# Patient Record
Sex: FEMALE | Race: WHITE | NOT HISPANIC OR LATINO
[De-identification: names, ages, dates, MRNs, and addresses within clinical notes are randomized per-mention and may not be internally consistent; named-entity substitution may affect disease eponyms.]

---

## 2018-03-05 ENCOUNTER — TRANSCRIPTION ENCOUNTER (OUTPATIENT)
Age: 63
End: 2018-03-05

## 2019-12-08 ENCOUNTER — TRANSCRIPTION ENCOUNTER (OUTPATIENT)
Age: 64
End: 2019-12-08

## 2022-06-01 ENCOUNTER — APPOINTMENT (OUTPATIENT)
Dept: ORTHOPEDIC SURGERY | Facility: CLINIC | Age: 67
End: 2022-06-01
Payer: COMMERCIAL

## 2022-06-01 VITALS — HEIGHT: 62 IN | BODY MASS INDEX: 27.97 KG/M2 | WEIGHT: 152 LBS

## 2022-06-01 DIAGNOSIS — M25.571 PAIN IN RIGHT ANKLE AND JOINTS OF RIGHT FOOT: ICD-10-CM

## 2022-06-01 DIAGNOSIS — S93.601A UNSPECIFIED SPRAIN OF RIGHT FOOT, INITIAL ENCOUNTER: ICD-10-CM

## 2022-06-01 PROCEDURE — L4361: CPT | Mod: RT

## 2022-06-01 PROCEDURE — 73610 X-RAY EXAM OF ANKLE: CPT | Mod: RT

## 2022-06-01 PROCEDURE — 73630 X-RAY EXAM OF FOOT: CPT | Mod: RT

## 2022-06-01 PROCEDURE — 99204 OFFICE O/P NEW MOD 45 MIN: CPT | Mod: 25

## 2022-06-01 RX ORDER — MELOXICAM 15 MG/1
15 TABLET ORAL DAILY
Qty: 30 | Refills: 0 | Status: ACTIVE | COMMUNITY
Start: 2022-06-01 | End: 1900-01-01

## 2022-06-01 NOTE — ASSESSMENT
[FreeTextEntry1] : After their examination today in the office and review of their radiographs, they have sustained a moderate medial midfoot sprain/contusion as result of their injury. I would recommend protection and immobilization in a Cam Walker boot since they are having difficulty with weight-bearing, secondary to pain. The Cam Walker boot was manipulated and fitted to them properly today in the office. They were able to walk comfortably in the CAM walker boot. However, I did also recommend using crutches for the next 3-5 days until the pain further resolves and they are able to walk fully in the Cam Walker boot comfortably without any pain. I would like them to come out of the boot for range of motion exercises and local ice therapy throughout the day, and they can also use an oral anti-inflammatory as tolerated for the next 3-5 days. I would like them to refrain from any high impact or strenuous physical activities. I will see them back in 2 weeks for repeat clinical and weight bearing radiographic examination. We also discussed at length that it can take 6-12 weeks for this injury to heal for their pain to hopefully fully resolve. We also discussed in detail that ankle sprains can lead to chronic pain or symptoms of instability that may require additional treatment.\par

## 2022-06-01 NOTE — HISTORY OF PRESENT ILLNESS
[Sudden] : sudden [3] : 3 [1] : 2 [Dull/Aching] : dull/aching [Throbbing] : throbbing [Tightness] : tightness [Frequent] : frequent [Sleep] : sleep [Rest] : rest [Ice] : ice [Walking] : walking [de-identified] :  MYA DRAKE is a 66 year female who is here today for right ankle pain. Pt fell down and hurt the right ankle.\par \par Ms. DRAKE is a 66 year female who presents today for evaluation of their Right ankle and foot injury. She states that a week ago she fell down the stairs and twisted her right ankle and foot she noticed pain on the top and inside aspect of her foot. She does find herself limping and needing to take an anti-inflammatory on a daily basis. She feels that the pain has only minimally improved. She denies any ankle leg or calf pain she does notice a mild swelling without any bruising. she denies any recurrence symptoms of ankle or foot sprain  [] : no [FreeTextEntry1] : right ankle [FreeTextEntry3] : 5/18/22 [FreeTextEntry5] :  MYA DRAKE is a 66 year female who is here today for right ankle pain. Pt fell down and hurt the right ankle.\par  [FreeTextEntry7] : up the back of the leg sometimes [FreeTextEntry9] : Motrin [de-identified] : motion

## 2022-06-01 NOTE — PHYSICAL EXAM
[de-identified] : General: Well-nourished, well developed female in no apparent distress\par Psych: Clear speech, pleasant mood and affect\par Neurological: Alert and oriented to person, place, and time\par Gait: Normal\par Respiratory: Normal respiratory effort\par Skin: No rashes, no lesions, no open skin, no ecchymosis, no erythema\par \par \par Inspection: No swelling, ecchymosis or redness noted.\par \par Alignment: Hindfoot alignment in anatomic valgus, neutral midfoot alignment.\par  \par Palpation: No anterior medial, central or lateral ankle joint line tenderness. No posterior medial or lateral ankle pain. No pain over proximal, midshaft or distal tibia or fibula. Leg Compartments are soft and nontender. Calf is soft and non-tender with a down-going Shi test. No pain over the lateral and medial malleolus. No pain over ATFL and CFL. Non-tender over the deltoid ligament or proximal and distal syndesmosis. Negative squeeze test of the syndesmosis. Non-tender over the fibula head or neck. Non-tender over the sinus tarsi.\par No pain over the course of the achilles, peroneal, posterior tibial, anterior tibial or the extensor tendons. \par On examination of the foot: Foot compartments are soft and nontender. No pain over the heel or plantar fascia. She is tender over the talar-navicular joint and navicular tuberosity. No tenderness over the calcaneocuboid joints, TMT or the Lisfranc joints on palpation and stress examination. No tenderness over the navicular, cuboid, cuneiforms, or metatarsals shafts. No pain beneath the metatarsal heads. Full range of motion through the MTP joints. The toes are reduced and well aligned. No pain on examination of the toes, and no webspace tenderness noted\par \par ROM: 15-20 degrees of dorsiflexion, 40 degrees of plantar flexion, 20 degrees of inversion and 20 degrees of eversion without pain. Able to flex and extend all toes without pain \par \par Muscle Strength: 5/5 strength with resisted dorsiflexion, plantar flexion, inversion and eversion without any pain.\par \par Stability: No instability or laxity noted with varus/valgus stress. Negative anterior and posterior drawer test. No pain with dorsiflexion external rotation stress test.\par \par Neurologic Exam : Sensation is grossly intact bilateral upper and lower extremities to light touch throughout. Sensation intact to the sural, posterior tibial, superficial peroneal nerve. 2+ patellar tendon and Achilles tendon reflexes are noted. There is a downgoing Babinski test. No clonus is noted bilaterally.\par \par Vascular: Arterial Pulses right and Left: posterior tibialis normal and dorsalis pedis normal. Right and Left: no edema, no varicosities and brisk capillary refill test normal.\par \par Radiographs: X-rays done today in the office 3 views of the ankle and foot without any limitations which reveal: No acute fractures or dislocations seen. The ankle mortise and syndesmosis are reduced and well aligned. There is no widening of the syndesmosis. No evidence of an osteochondral defect. No fractures of the lateral process of the talus or of the anterior process of the calcaneus noted. The midfoot and forefoot joints are reduced and well aligned.\par

## 2022-06-12 ENCOUNTER — FORM ENCOUNTER (OUTPATIENT)
Age: 67
End: 2022-06-12

## 2022-06-13 ENCOUNTER — APPOINTMENT (OUTPATIENT)
Dept: MRI IMAGING | Facility: CLINIC | Age: 67
End: 2022-06-13
Payer: COMMERCIAL

## 2022-06-13 PROCEDURE — 73718 MRI LOWER EXTREMITY W/O DYE: CPT | Mod: RT

## 2022-06-15 ENCOUNTER — APPOINTMENT (OUTPATIENT)
Dept: ORTHOPEDIC SURGERY | Facility: CLINIC | Age: 67
End: 2022-06-15
Payer: COMMERCIAL

## 2022-06-15 VITALS — WEIGHT: 152 LBS | HEIGHT: 62 IN | BODY MASS INDEX: 27.97 KG/M2

## 2022-06-15 PROCEDURE — 99214 OFFICE O/P EST MOD 30 MIN: CPT

## 2022-06-15 NOTE — HISTORY OF PRESENT ILLNESS
[4] : 4 [1] : 2 [de-identified] : 6/15/2022- pt is here for a follow up on her right ankle. She would like to review her mri results that she has done on Monday\par \par Is here for follow up of her medial midfoot injury and distal posterior tibial tendon injury. She has been protected in a Cam Walker boot and she feels that her pain has improved significantly. She still has some mild pain on the inside aspect of the foot. She denies any calf pain or any shortness of breath. She does feel that she is improving and healing well. [FreeTextEntry1] : right ankle [FreeTextEntry5] : pt is here for a follow up on her right ankle. She would like to review her mri results that she has done on Monday

## 2022-06-15 NOTE — PHYSICAL EXAM
[de-identified] : General: Well-nourished, well developed female in no apparent distress\par Psych: Clear speech, pleasant mood and affect\par Neurological: Alert and oriented to person, place, and time\par Gait: antalgic\par Respiratory: Normal respiratory effort\par Skin: No rashes, no lesions, no open skin, no ecchymosis, no erythema\par \par \par Inspection: No swelling, ecchymosis or redness noted.\par \par Alignment: Hindfoot alignment in anatomic valgus, neutral midfoot alignment.\par  \par Palpation: No anterior medial, central or lateral ankle joint line tenderness. No posterior medial or lateral ankle pain. No pain over proximal, midshaft or distal tibia or fibula. Leg Compartments are soft and nontender. Calf is soft and non-tender with a down-going Shi test. No pain over the lateral and medial malleolus. No pain over ATFL and CFL. Non-tender over the deltoid ligament or proximal and distal syndesmosis. Negative squeeze test of the syndesmosis. Non-tender over the fibula head or neck. Non-tender over the sinus tarsi.\par No pain over the course of the achilles, peroneal, proximal posterior tibial, anterior tibial or the extensor tendons. \par On examination of the foot: Foot compartments are soft and nontender. No pain over the heel or plantar fascia. She is tender over the talar-navicular joint and navicular tuberosity. No tenderness over the calcaneocuboid joints, TMT or the Lisfranc joints on palpation and stress examination. No tenderness over the navicular, cuboid, cuneiforms, or metatarsals shafts. No pain beneath the metatarsal heads. Full range of motion through the MTP joints. The toes are reduced and well aligned. No pain on examination of the toes, and no webspace tenderness noted\par \par ROM: 15-20 degrees of dorsiflexion, 40 degrees of plantar flexion, 20 degrees of inversion and 20 degrees of eversion without pain. Able to flex and extend all toes without pain \par \par Muscle Strength: 5/5 strength with resisted dorsiflexion, plantar flexion, inversion and eversion without any pain.\par \par Stability: No instability or laxity noted with varus/valgus stress. Negative anterior and posterior drawer test. No pain with dorsiflexion external rotation stress test.\par \par Neurologic Exam : Sensation is grossly intact bilateral upper and lower extremities to light touch throughout. Sensation intact to the sural, posterior tibial, superficial peroneal nerve. 2+ patellar tendon and Achilles tendon reflexes are noted. There is a downgoing Babinski test. No clonus is noted bilaterally.\par \par Vascular: Arterial Pulses right and Left: posterior tibialis normal and dorsalis pedis normal. Right and Left: no edema, no varicosities and brisk capillary refill test normal.\par \par \par

## 2022-06-15 NOTE — ASSESSMENT
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 I did recommend three more weeks of protection in the air Cam Walker boot which she feels has been helping her significantly she can remove the boot and perform range of motion exercises as well as heat and ice therapy interchangeably she can perform range of motion exercises throughout the day. And I would like to see her back in three weeks for repeat clinical and weight bearing radio graphic examination and upper pain has improved significantly we can then begin physical therapy.

## 2022-07-06 ENCOUNTER — APPOINTMENT (OUTPATIENT)
Dept: ORTHOPEDIC SURGERY | Facility: CLINIC | Age: 67
End: 2022-07-06

## 2022-08-24 ENCOUNTER — APPOINTMENT (OUTPATIENT)
Dept: ORTHOPEDIC SURGERY | Facility: CLINIC | Age: 67
End: 2022-08-24

## 2022-08-24 VITALS — BODY MASS INDEX: 27.97 KG/M2 | WEIGHT: 152 LBS | HEIGHT: 62 IN

## 2022-08-24 DIAGNOSIS — M25.571 PAIN IN RIGHT ANKLE AND JOINTS OF RIGHT FOOT: ICD-10-CM

## 2022-08-24 DIAGNOSIS — S93.601D UNSPECIFIED SPRAIN OF RIGHT FOOT, SUBSEQUENT ENCOUNTER: ICD-10-CM

## 2022-08-24 DIAGNOSIS — S92.254D NONDISPLACED FRACTURE OF NAVICULAR [SCAPHOID] OF RIGHT FOOT, SUBSEQUENT ENCOUNTER FOR FRACTURE WITH ROUTINE HEALING: ICD-10-CM

## 2022-08-24 DIAGNOSIS — M76.821 POSTERIOR TIBIAL TENDINITIS, RIGHT LEG: ICD-10-CM

## 2022-08-24 PROCEDURE — 73610 X-RAY EXAM OF ANKLE: CPT | Mod: RT

## 2022-08-24 PROCEDURE — 99213 OFFICE O/P EST LOW 20 MIN: CPT

## 2022-08-24 PROCEDURE — 73630 X-RAY EXAM OF FOOT: CPT | Mod: RT

## 2022-08-24 NOTE — ASSESSMENT
[FreeTextEntry1] : After her examination today in the office and review of her radiographs I do think she continues to remain mildly tender over the medial aspect of the navicular she did have a injury to the medial navicular over the distal posterior tibial tendon however she has no pain when she is walking in a good supportive shoe or sneaker.  I did recommend starting physical therapy to work on range of motion and strengthening and stabilization balancing with strengthening and local modalities to the distal posterior tibial tendon as well as its insertion onto the navicular tuberosity.  I would like her to try to refrain from high heels or wedged shoes till she completes her physical therapy regimen until we are able to condition the ankle and foot.  I did recommend local ice and heat therapy as needed over the medial aspect of the ankle and foot and to wear good supportive shoe wear on a daily basis and I would like to see her back in 4 to 6 weeks when she completes her physical therapy regimen.\par I discussed with her that if she continues to have pain over this area of the medial navicular then I would recommend a CT scan if physical therapy does not help resolve her pain or discomfort

## 2022-08-24 NOTE — HISTORY OF PRESENT ILLNESS
[3] : 3 [1] : 2 [Dull/Aching] : dull/aching [Rest] : rest [Walking] : walking [de-identified] : Is here for follow-up of her right ankle and foot injury.  She states that she had been doing very well and she continues to do well when she does wear a sneaker or a good supportive sandal.  However she tried to wear a wedged shoe with a slight heel and noticed increased pain on the inside aspect of the ankle and foot.  With a few days of rest the pain had resolved.  She does not have any swelling or bruising or any significant weakness of the ankle or foot.

## 2022-08-24 NOTE — IMAGING
[de-identified] : General: Well-nourished,  in no apparent distress\par Psych: Clear speech, pleasant mood and affect\par Neurological: Alert and oriented to person, place, and time\par Gait: Normal\par Respiratory: Normal respiratory effort\par Skin: No rashes, no lesions, no open skin, no ecchymosis, no erythema\par \par On examination of the ankle and foot there is no swelling or ecchymosis or erythema noted. Hindfoot alignment is in slight valgus. There is no pain over the proximal mid shaft or distal tibia or fibula. Negative syndesmotic squeeze test. There is no pain over the ankle joint, subtalar joint, transverse tarsal joints, or TMT joints. No pain over the medial or lateral malleolus. No pain over the proximal or mid shaft tibia or fibula. The leg compartments including the calf are soft and non-tender with a down-going Shi test. There is no pain over the ATFL or CFL laterally or deltoid ligament medially. \par There is no pain  over the course of the Achilles, peroneal or proximal posterior tibial tendons.  She is tender mildly over the distal posterior tibial tendon as well as over the medial aspect of the navicular and the navicular tuberosity.  No ankle or hindfoot instability is noted. No pain over the calcaneus, talar neck or talar head. No pain over the navicular, cuboid, cuneiforms, metatarsal shafts or on examination of the toes. No web-space tenderness. No pain over the metatarsal heads or MTP joints. There is full flexion and extension of all the toes. The hallux and lesser toes are reduced and well aligned. \par Sensation is grossly intact throughout to light touch, there is 2+ Achilles tendon reflexes. There is 2+ DP/PT pulses, with brisk capillary refill in all of the toes.\par There is good range of motion of the ankle and hindfoot with 5/5 strength throughout all muscle groups.\par \par X-rays done today in the office 3 views of the ankle and foot which reveals no acute fractures or dislocations.  No fracture of the medial navicular is noted on the radiographs.  The ankle mortise and syndesmosis are reduced and well aligned. The midfoot and forefoot joints are reduced and well aligned\par

## 2022-09-13 ENCOUNTER — APPOINTMENT (OUTPATIENT)
Dept: ORTHOPEDIC SURGERY | Facility: CLINIC | Age: 67
End: 2022-09-13

## 2022-09-13 VITALS — WEIGHT: 152 LBS | HEIGHT: 62 IN | BODY MASS INDEX: 27.97 KG/M2

## 2022-09-13 DIAGNOSIS — M17.12 UNILATERAL PRIMARY OSTEOARTHRITIS, LEFT KNEE: ICD-10-CM

## 2022-09-13 PROCEDURE — 99214 OFFICE O/P EST MOD 30 MIN: CPT

## 2022-09-17 NOTE — HISTORY OF PRESENT ILLNESS
[de-identified] : pt is here for a follow up Visit of the left knee. pt has  pain in the left knee. pt did pt and it helped.  pt was taking meloxicam and it helped with the pain. pt was doing well  until  the pain in the left  knee started    again about a month ago . pt has slight swelling around the left knee

## 2022-09-17 NOTE — DISCUSSION/SUMMARY
[Medication Risks Reviewed] : Medication risks reviewed [Surgical risks reviewed] : Surgical risks reviewed [de-identified] : discussed risks of surgical treatment and nonsurgical treatment of arthritis, discussed risks of steroid injection plus or minus viscosupplementation, risks of zilretta and benefits, role of surgery and mri, risks and role of nsaids and side effects, benefits of therapy\par prescribed nsaids and discussed risks of side effects and timing and management of medication.  side effects can include gi ulcers and irritation as welll as kidney failure and bleeding issues\par The risks and benefits of surgery have been discussed. Risks include but are not limited to bleeding, infection, reaction to anesthesia, injury to blood vessels and nerves, malunion, nonunion, DVT, PE, necessity of repeat surgery, chronic pain, loss of limb and death. The patient understands the risks and has chosen to proceed with conservative care. All questions have been answered.\par

## 2022-11-02 ENCOUNTER — APPOINTMENT (OUTPATIENT)
Dept: ORTHOPEDIC SURGERY | Facility: CLINIC | Age: 67
End: 2022-11-02

## 2024-09-19 ENCOUNTER — APPOINTMENT (OUTPATIENT)
Dept: OBGYN | Facility: CLINIC | Age: 69
End: 2024-09-19
Payer: COMMERCIAL

## 2024-09-19 PROCEDURE — 36415 COLL VENOUS BLD VENIPUNCTURE: CPT

## 2024-09-19 PROCEDURE — 99459 PELVIC EXAMINATION: CPT

## 2024-09-19 PROCEDURE — 99387 INIT PM E/M NEW PAT 65+ YRS: CPT

## 2024-09-19 PROCEDURE — G0444 DEPRESSION SCREEN ANNUAL: CPT | Mod: 59

## 2025-08-27 ENCOUNTER — APPOINTMENT (OUTPATIENT)
Dept: OBGYN | Facility: CLINIC | Age: 70
End: 2025-08-27
Payer: MEDICARE

## 2025-08-27 PROCEDURE — 99203 OFFICE O/P NEW LOW 30 MIN: CPT
